# Patient Record
Sex: FEMALE | Race: ASIAN | NOT HISPANIC OR LATINO | Employment: FULL TIME | URBAN - METROPOLITAN AREA
[De-identification: names, ages, dates, MRNs, and addresses within clinical notes are randomized per-mention and may not be internally consistent; named-entity substitution may affect disease eponyms.]

---

## 2018-11-20 ENCOUNTER — OFFICE VISIT (OUTPATIENT)
Dept: OPTOMETRY | Facility: CLINIC | Age: 28
End: 2018-11-20
Payer: COMMERCIAL

## 2018-11-20 ENCOUNTER — TELEPHONE (OUTPATIENT)
Dept: OPTOMETRY | Facility: CLINIC | Age: 28
End: 2018-11-20

## 2018-11-20 DIAGNOSIS — H52.13 MYOPIA OF BOTH EYES: Primary | ICD-10-CM

## 2018-11-20 PROCEDURE — 92015 DETERMINE REFRACTIVE STATE: CPT | Performed by: OPTOMETRIST

## 2018-11-20 PROCEDURE — 92004 COMPRE OPH EXAM NEW PT 1/>: CPT | Performed by: OPTOMETRIST

## 2018-11-20 PROCEDURE — 92310 CONTACT LENS FITTING OU: CPT | Mod: GA | Performed by: OPTOMETRIST

## 2018-11-20 ASSESSMENT — REFRACTION_CURRENTRX
OS_BRAND: ALCON AIR OPTIX PLUS HYDRAGLYDE BC 8.6, D 14.2
OD_BRAND: ALCON AIR OPTIX PLUS HYDRAGLYDE BC 8.6, D 14.2
OD_SPHERE: -4.25
OS_SPHERE: -4.25

## 2018-11-20 ASSESSMENT — REFRACTION_MANIFEST
OS_SPHERE: -4.75
OD_AXIS: 065
OD_CYLINDER: +0.25
OS_CYLINDER: +0.25
OS_AXIS: 100
OD_SPHERE: -4.75

## 2018-11-20 ASSESSMENT — VISUAL ACUITY
OS_CC: 20/20
CORRECTION_TYPE: CONTACTS
OD_CC: 20/30
METHOD: SNELLEN - LINEAR
OD_CC: 20/20
OS_CC: 20/30

## 2018-11-20 ASSESSMENT — TONOMETRY
OD_IOP_MMHG: 17
IOP_METHOD: TONOPEN
OS_IOP_MMHG: 15

## 2018-11-20 ASSESSMENT — EXTERNAL EXAM - LEFT EYE: OS_EXAM: NORMAL

## 2018-11-20 ASSESSMENT — SLIT LAMP EXAM - LIDS
COMMENTS: NORMAL
COMMENTS: NORMAL

## 2018-11-20 ASSESSMENT — CUP TO DISC RATIO
OD_RATIO: 0.3
OS_RATIO: 0.3

## 2018-11-20 ASSESSMENT — CONF VISUAL FIELD
OS_NORMAL: 1
OD_NORMAL: 1

## 2018-11-20 ASSESSMENT — EXTERNAL EXAM - RIGHT EYE: OD_EXAM: NORMAL

## 2018-11-20 NOTE — PATIENT INSTRUCTIONS
Eyeglass prescription given.    Contact lens prescription given.  Dispensed trial contacts of -4.00.  Trials of -4.25 will be ordered for .  Ok to order if satisfied.  Optional glasses with bluetech lens over contact lenses for stress relieving.    Return in 1 year for a complete eye exam or sooner if needed.    Jeovany Casas, TUNG    The affects of the dilating drops last for 4- 6 hours.  You will be more sensitive to light and vision will be blurry up close.  Mydriatic sunglasses were given if needed.      Optometry Providers       Clinic Locations                                 Telephone Number   Dr. Cheryl White [place]   [facility] [place]   [place] [phone]     [place] [facility] Hours:                [facility] Hours:       [place] [facility] Hours:   [address]   [address] [facility] [address]     [address] [place] [address]   RON Gomez [address]    [place] [address]  Phone: [phone]                    Phone: [phone]     Phone: [phone]                      Monday 8:00-7:00                          Monday 8:00-7:00                          Monday 8:00-7:00              Tuesday 8:00-6:00                          Tuesday 8:00-7:00                          Tuesday 8:00-7:00              Wednesday 8:00-6:00                  Wednesday 8:00-7:00                   Wednesday 8:00-7:00      Thursday 8:00-6:00                        Thursday 8:00-7:00                         Thursday 8:00-7:00            Friday 8:00-5:00                              Friday 8:00-5:00                              Friday 8:00-5:00    [facility] Hours:   [address] [facility] R[place] [id number] [phone]    Monday 8:00-7:00  Tuesday 8:00-7:00  Wednesday 8:00-7:00  Thursday 8:00-7:00  Friday 8:00-5:00  Please log on to [email or web address] to order your contact lenses.  The link is found on  the [facility] page.  As always, Thank you for trusting us with your health care needs!

## 2018-11-20 NOTE — LETTER
11/20/2018         RE: Coco Shell  [address] [address] [place] [address]        Dear Colleague,    Thank you for referring your patient, Coco Shell, to the [facility]. Please see a copy of my visit note below.    Chief Complaint   Patient presents with     COMPREHENSIVE EYE EXAM     more contacts,fit fee ok        Previous contact lens wearer? Yes: breathables  xw monthly 8.6 14.0 -3.75 both eyes/same as biofinity  Comfort of contact lenses :good  Satisfied with current lenses: Yes        Last Eye Exam: 1+ year  Dilated Previously: Yes    What are you currently using to see?  glasses and contacts    Distance Vision Acuity: Noticed gradual change in both eyes    Near Vision Acuity: Satisfied with vision while reading  with glasses    Eye Comfort: good  Do you use eye drops? 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     Medical, surgical and family histories reviewed and updated 11/20/2018.       OBJECTIVE: See Ophthalmology exam    ASSESSMENT:    ICD-10-CM    1. Myopia of both eyes H52.13 EYE EXAM (SIMPLE-NONBILLABLE)     CONTACT LENS FITTING,BILAT w/ signed waiver     REFRACTION      PLAN:     Patient Instructions   Eyeglass prescription given.    Contact lens prescription given.  Dispensed trial contacts.  Ok to order if satisfied.  Optional glasses with bluetech lens over contact lenses for stress relieving.    Return in 1 year for a complete eye exam or sooner if needed.    Jeovany Casas, OD                               Again, thank you for allowing me to participate in the care of your patient.        Sincerely,        Jeovany Casas, OD

## 2018-11-20 NOTE — PROGRESS NOTES
Chief Complaint   Patient presents with     COMPREHENSIVE EYE EXAM     more contacts,fit fee ok        Previous contact lens wearer? Yes: breathables  xw monthly 8.6 14.0 -3.75 both eyes/same as biofinity  Comfort of contact lenses :good  Satisfied with current lenses: Yes        Last Eye Exam: 1+ year  Dilated Previously: Yes    What are you currently using to see?  glasses and contacts    Distance Vision Acuity: Noticed gradual change in both eyes    Near Vision Acuity: Satisfied with vision while reading  with glasses    Eye Comfort: good  Do you use eye drops? 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     Medical, surgical and family histories reviewed and updated 11/20/2018.       OBJECTIVE: See Ophthalmology exam    ASSESSMENT:    ICD-10-CM    1. Myopia of both eyes H52.13 EYE EXAM (SIMPLE-NONBILLABLE)     CONTACT LENS FITTING,BILAT w/ signed waiver     REFRACTION      PLAN:     Patient Instructions   Eyeglass prescription given.    Contact lens prescription given.  Dispensed trial contacts of -4.00.  Trials of -4.25 will be ordered for .  Ok to order if satisfied.  Optional glasses with bluetech lens over contact lenses for stress relieving.    Return in 1 year for a complete eye exam or sooner if needed.    Jeovany Casas, OD

## 2018-11-20 NOTE — MR AVS SNAPSHOT
After Visit Summary   11/20/2018    Coco Shell    MRN: [id number]           Patient Information     Date Of Birth          1990        Visit Information        Provider Department      11/20/2018 4:00 PM Jeovany Casas OD [facility]        Today's Diagnoses     Myopia of both eyes    -  1      Care Instructions    Eyeglass prescription given.    Contact lens prescription given.  Dispensed trial contacts.  Ok to order if satisfied.  Optional glasses with bluetech lens over contact lenses for stress relieving.    Return in 1 year for a complete eye exam or sooner if needed.    Jeovany Casas, TUNG        The affects of the dilating drops last for 4- 6 hours.  You will be more sensitive to light and vision will be blurry up close.  Mydriatic sunglasses were given if needed.      Optometry Providers       Clinic Locations                                 Telephone Number   Dr. Cheryl White [facility] and [place]   [place] [phone]     [place] Hours:                [facility] Hours:       [place] [facility] Hours:   [address] [facility] [address]   [address] [facility]     [address] [place] [address]   [place] [address]    [place] [address]  Phone: [phone]                    Phone: [phone]     Phone: [phone]                      Monday 8:00-7:00                          Monday 8:00-7:00                          Monday 8:00-7:00              Tuesday 8:00-6:00                          Tuesday 8:00-7:00                          Tuesday 8:00-7:00              Wednesday 8:00-6:00                  Wednesday 8:00-7:00                   Wednesday 8:00-7:00      Thursday 8:00-6:00                        Thursday 8:00-7:00                         Thursday 8:00-7:00            Friday 8:00-5:00                              Friday 8:00-5:00                               Friday 8:00-5:00    [facility] Hours:   [address] [facility] [place] [address]  [phone]    Monday 8:00-7:00  Tuesday 8:00-7:00  Wednesday 8:00-7:00  Thursday 8:00-7:00  Friday 8:00-5:00  Please log on to [place] [facility] [employer] to order your contact lenses.  The link is found on the [facility] Care [facility] Vision Services page.  As always, Thank you for trusting us with your health care needs!            Follow-ups after your visit        Follow-up notes from your care team     Return in about 1 year (around 11/20/2019) for Annual Visit.      Who to contact     If you have questions or need follow up information about today's clinic visit or your schedule please contact [facility] directly at [phone].  Normal or non-critical lab and imaging results will be communicated to you by MyChart, letter or phone within 4 business days after the clinic has received the results. If you do not hear from us within 7 days, please contact the clinic through MyChart or phone. If you have a critical or abnormal lab result, we will notify you by phone as soon as possible.  Submit refill requests through [facility] or call your pharmacy and they will forward the refill request to us. Please allow 3 business days for your refill to be completed.          Additional Information About Your Visit        Care EveryWhere ID     This is your Care EveryWhere ID. This could be used by other organizations to access your [place] medical records  [id number]         Blood Pressure from Last 3 Encounters:   No data found for BP    Weight from Last 3 Encounters:   No data found for Wt              We Performed the Following     CONTACT LENS FITTING,BILAT w/ signed waiver     EYE EXAM (SIMPLE-NONBILLABLE)     REFRACTION        Primary Care Provider Fax #    Physician No Ref-Primary [phone]       No address on file        Equal Access to Services     LIGIA ROSALES : ava Ibarra  tamara mondragon waxdre huyin hayaarichi hernandezramana arnoldorupali lafedericorichi marie. So [facility] [phone].    ATENCIÓN: Si kim rodriguez, tiene a ventura disposición servicios gratuitos de asistencia lingüística. Llame al [phone].    We comply with applicable federal civil rights laws and [place] laws. We do not discriminate on the basis of race, color, national origin, age, disability, sex, sexual orientation, or gender identity.            Thank you!     Thank you for choosing [facility]  for your care. Our goal is always to provide you with excellent care. Hearing back from our patients is one way we can continue to improve our services. Please take a few minutes to complete the written survey that you may receive in the mail after your visit with us. Thank you!             Your Updated Medication List - Protect others around you: Learn how to safely use, store and throw away your medicines at www.disposemymeds.org.      Notice  As of 11/20/2018  5:09 PM    You have not been prescribed any medications.

## 2018-11-20 NOTE — TELEPHONE ENCOUNTER
Right Lionel Air Optix Plus HydraGlyde BC 8.6, D 14.2 -4.25   Left Lionel Air Optix Plus HydraGlyde BC 8.6, D 14.2 -4.25       Order trials of final prescription for  at [place]- then ok to order.      Carey Tena ORDERED CONTACTS 11/20/2018.

## 2021-05-14 ENCOUNTER — THERAPY VISIT (OUTPATIENT)
Dept: PHYSICAL THERAPY | Facility: CLINIC | Age: 31
End: 2021-05-14
Payer: COMMERCIAL

## 2021-05-14 DIAGNOSIS — M54.50 BILATERAL LOW BACK PAIN WITHOUT SCIATICA: ICD-10-CM

## 2021-05-14 PROCEDURE — 97530 THERAPEUTIC ACTIVITIES: CPT | Performed by: PHYSICAL THERAPIST

## 2021-05-14 PROCEDURE — 97161 PT EVAL LOW COMPLEX 20 MIN: CPT | Performed by: PHYSICAL THERAPIST

## 2021-05-14 PROCEDURE — 97110 THERAPEUTIC EXERCISES: CPT | Performed by: PHYSICAL THERAPIST

## 2021-05-14 NOTE — PROGRESS NOTES
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  She was lifting 40# pots to do some home gardening. Coco had a sudden and severe onset of low back pain that kept her from work for 4 days. She treated this with ibuprofen, rest, and a muscle relaxant. She feels well and back to normal now - but wants a core strengthening program to avoid this in the future. 0/10 pain. No referred symptoms. PMH of scoliosis, that was followed as a young person. No need for bracing or surgery.    The history is provided by the patient. No  was used.   Therapist Generated HPI Evaluation         Type of problem:  Lumbar.    This is a new condition.  Condition occurred with:  Lifting and twisting.  Where condition occurred: at home.  Patient reports pain:  Lower lumbar spine.  Pain quality: resolved.   Pain radiates to:  No radiation.   Since onset symptoms are rapidly improving.         Restrictions due to condition include:  Working in normal job without restrictions.  Barriers include:  None as reported by patient.    Patient Health History  Coco Shell being seen for recent acute LBP that has resolved.          Pain is reported as 0/10 on pain scale.  General health as reported by patient is good.  Pertinent medical history includes: none. Other medical history details: scoliosis.   Red flags:  None as reported by patient.  Medical allergies: none.       Current medications:  None. Other medications details: Vitamin D.    Current occupation is Pharmacist.   Primary job tasks include:  Computer work and prolonged sitting.                                    Objective:  Standing Alignment:                  General deviations alignment: left thoracolumbar scoliosis.  Gait:    Gait Type:  Normal                    Lumbar/SI Evaluation  ROM:  AROM Lumbar: normal      Lumbar Myotomes:  normal                  Neural  Tension/Mobility:  Lumbar:  Not assessed        Lumbar Palpation:    Tenderness present at Left:    Erector Spinae  Tenderness present at Right: Erector Spinae                                                                                          Musculoskeletal:        Back:        ROS    Assessment/Plan:    Patient is a 30 year old female with lumbar complaints.    Patient has the following significant findings with corresponding treatment plan.                Diagnosis 1:  Lumbar pain  Pain -  self management, education and home program  Impaired muscle performance - neuro re-education and home program  Decreased function - therapeutic activities and home program    Therapy Evaluation Codes:   1) History comprised of:   Personal factors that impact the plan of care:      None.    Comorbidity factors that impact the plan of care are:      Scoliosis.     Medications impacting care: Vitamin D.  2) Examination of Body Systems comprised of:   Body structures and functions that impact the plan of care:      Lumbar spine.   Activity limitations that impact the plan of care are:      Lifting.  3) Clinical presentation characteristics are:   Stable/Uncomplicated.  4) Decision-Making    Low complexity using standardized patient assessment instrument and/or measureable assessment of functional outcome.  Cumulative Therapy Evaluation is: Low complexity.    Previous and current functional limitations:  (See Goal Flow Sheet for this information)    Short term and Long term goals: (See Goal Flow Sheet for this information)     Communication ability:  Patient appears to be able to clearly communicate and understand verbal and written communication and follow directions correctly.  Treatment Explanation - The following has been discussed with the patient:   RX ordered/plan of care  Anticipated outcomes  Possible risks and side effects  This patient would benefit from PT intervention to resume normal activities.   Rehab  potential is good.    Frequency:  1 X week, once daily  Duration:  for 5 weeks  Discharge Plan:  Achieve all LTG.  Independent in home treatment program.  Reach maximal therapeutic benefit.    Please refer to the daily flowsheet for treatment today, total treatment time and time spent performing 1:1 timed codes.

## 2021-05-14 NOTE — LETTER
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 Coco had a sudden and severe onset of low back pain that kept her from work for 4 days. She treated this with ibuprofen, rest, and a muscle relaxant. She feels well and back to normal now - but wants a core strengthening program to avoid this in the future. 0/10 pain. No referred symptoms. PMH of scoliosis, that was followed as a young person. No need for bracing or surgery. The history is provided by the patient. No  was used.     Therapist Generated HPI Evaluation  Type of problem:  Lumbar.  This is a new condition.  Condition occurred with:  Lifting and twisting.  Where condition occurred: at home.  Patient reports pain:  Lower lumbar spine.  Pain quality: resolved.   Pain radiates to:  No radiation.   Since onset symptoms are rapidly improving.  Restrictions due to condition include:  Working in normal job without restrictions.  Barriers include:  None as reported by patient.    Patient Health History  Coco Shell being seen for recent acute LBP that has resolved.   Pain is reported as 0/10 on pain scale.  General health as reported by patient is good.  Pertinent medical history includes: none. Other medical history details: scoliosis.   Red flags:  None as reported by patient.  Medical allergies: none.    Current medications:  None. Other medications  details: Vitamin D.    Current occupation is Pharmacist.   Primary job tasks include:  Computer work and prolonged sitting.                                  Objective:  Standing Alignment:    General deviations alignment: left thoracolumbar scoliosis.  Gait:    Gait Type:  Normal     Lumbar/SI Evaluation  ROM:  AROM Lumbar: normal  Lumbar Myotomes:  normal  Neural Tension/Mobility:  Lumbar:  Not assessed    Lumbar Palpation:    Tenderness present at Left:    Erector Spinae  Tenderness present at Right: Erector Spinae                             Musculoskeletal:        Back:      Assessment/Plan:    Patient is a 30 year old female with lumbar complaints.    Patient has the following significant findings with corresponding treatment plan.                Diagnosis 1:  Lumbar pain  Pain -  self management, education and home program  Impaired muscle performance - neuro re-education and home program  Decreased function - therapeutic activities and home program    Therapy Evaluation Codes:   1) History comprised of:   Personal factors that impact the plan of care:      None.    Comorbidity factors that impact the plan of care are:      Scoliosis.     Medications impacting care: Vitamin D.  2) Examination of Body Systems comprised of:   Body structures and functions that impact the plan of care:      Lumbar spine.   Activity limitations that impact the plan of care are:      Lifting.  3) Clinical presentation characteristics are:   Stable/Uncomplicated.  4) Decision-Making    Low complexity using standardized patient assessment instrument and/or measureable assessment of functional outcome.  Cumulative Therapy Evaluation is: Low complexity.    Previous and current functional limitations:  (See Goal Flow Sheet for this information)    Short term and Long term goals: (See Goal Flow Sheet for this information)     Communication ability:  Patient appears to be able to clearly communicate and understand verbal and written  communication and follow directions correctly.  Treatment Explanation - The following has been discussed with the patient:   RX ordered/plan of care  Anticipated outcomes  Possible risks and side effects  This patient would benefit from PT intervention to resume normal activities.   Rehab potential is good.    Frequency:  1 X week, once daily  Duration:  for 5 weeks  Discharge Plan:  Achieve all LTG.  Independent in home treatment program.  Reach maximal therapeutic benefit.    Thank you for your referral.    INQUIRIES  Therapist: Rashida Strauss PT, DPT  [address] [address]  Phone: [phone]  Fax: [phone]

## 2021-05-19 ENCOUNTER — THERAPY VISIT (OUTPATIENT)
Dept: PHYSICAL THERAPY | Facility: CLINIC | Age: 31
End: 2021-05-19
Payer: COMMERCIAL

## 2021-05-19 DIAGNOSIS — M54.50 ACUTE BILATERAL LOW BACK PAIN WITHOUT SCIATICA: ICD-10-CM

## 2021-05-19 PROCEDURE — 97110 THERAPEUTIC EXERCISES: CPT | Performed by: PHYSICAL THERAPIST

## 2021-05-19 PROCEDURE — 97530 THERAPEUTIC ACTIVITIES: CPT | Performed by: PHYSICAL THERAPIST

## 2021-05-19 NOTE — LETTER
[facility]  [address] [address] [place] [address]  [phone]    2021    Re: Coco Shell   :   1990  MRN:  [id number]   REFERRING PHYSICIAN:   Tegan CONNELL [facility]  Date of Initial Evaluation:  2021  Visits:  Rxs Used: 2  Reason for Referral:  Acute bilateral low back pain without sciatica    DISCHARGE REPORT  Progress reporting period is from  to 2021.       SUBJECTIVE  Subjective: Doing well, cautious with body mechanics and not doing any heavy lifitng, consistent with HEP - has returned to Yoga.   Current Pain level: 0/10.     Initial Pain level: 10/10. Changes in function:  Yes (See Goal flowsheet attached for changes in current functional level).  Adverse reaction to treatment or activity: None    OBJECTIVE  Objective: trunk AROM is now WNL's, some glut and quad weakness - but not due to low back     ASSESSMENT/PLAN  Updated problem list and treatment plan: Diagnosis 1:  LBP  Pain -  self management, education and home program  Decreased ROM/flexibility - therapeutic exercise and home program  Decreased strength - therapeutic exercise and home program  Impaired muscle performance - neuro re-education and home program  Decreased function - therapeutic activities and home program  Impaired posture - neuro re-education  STG/LTGs have been met or progress has been made towards goals:  Yes (See Goal flow sheet completed today.)  Assessment of Progress: The patient's condition is improving.  Self Management Plans:  Patient has been instructed in a home treatment program.  Patient  has been instructed in self management of symptoms.    Coco continues to require the following intervention to meet STG and LTG's:  PT intervention is no longer required to meet STG/LTG.    Recommendations:  This patient is ready to be discharged from therapy and continue their home  treatment program.    Thank you for your referral.    INQUIRIES  Therapist: Rashida Strauss PT, DPT  [address] [address]  Phone: [phone]  Fax: [phone]

## 2021-06-23 PROBLEM — M54.50 BILATERAL LOW BACK PAIN WITHOUT SCIATICA: Status: RESOLVED | Noted: 2021-05-14 | Resolved: 2021-06-23

## 2021-06-24 NOTE — PROGRESS NOTES
Subjective:  HPI  Physical Exam                    Objective:  System    Physical Exam    General     ROS    Assessment/Plan:    DISCHARGE REPORT    Progress reporting period is from 5/7 to 5/14/2021.       SUBJECTIVE   Subjective: Doing well, cautious with body mechanics and not doing any heavy lifitng, consistent with HEP - has returned to Yoga     Current Pain level: 0/10.      Initial Pain level: 10/10.   Changes in function:  Yes (See Goal flowsheet attached for changes in current functional level)  Adverse reaction to treatment or activity: None    OBJECTIVE    Objective: trunk AROM is now WNL's, some glut and quad weakness - but not due to low back     ASSESSMENT/PLAN  Updated problem list and treatment plan: Diagnosis 1:  LBP  Pain -  self management, education and home program  Decreased ROM/flexibility - therapeutic exercise and home program  Decreased strength - therapeutic exercise and home program  Impaired muscle performance - neuro re-education and home program  Decreased function - therapeutic activities and home program  Impaired posture - neuro re-education  STG/LTGs have been met or progress has been made towards goals:  Yes (See Goal flow sheet completed today.)  Assessment of Progress: The patient's condition is improving.  Self Management Plans:  Patient has been instructed in a home treatment program.  Patient  has been instructed in self management of symptoms.    Coco continues to require the following intervention to meet STG and LTG's:  PT intervention is no longer required to meet STG/LTG.    Recommendations:  This patient is ready to be discharged from therapy and continue their home treatment program.    Please refer to the daily flowsheet for treatment today, total treatment time and time spent performing 1:1 timed codes.

## 2022-12-19 ENCOUNTER — LAB REQUISITION (OUTPATIENT)
Dept: LAB | Facility: CLINIC | Age: 32
End: 2022-12-19

## 2022-12-19 DIAGNOSIS — Z12.4 ENCOUNTER FOR SCREENING FOR MALIGNANT NEOPLASM OF CERVIX: ICD-10-CM

## 2022-12-19 PROCEDURE — G0145 SCR C/V CYTO,THINLAYER,RESCR: HCPCS | Performed by: NURSE PRACTITIONER

## 2022-12-19 PROCEDURE — 87624 HPV HI-RISK TYP POOLED RSLT: CPT | Performed by: NURSE PRACTITIONER

## 2022-12-21 LAB
BKR LAB AP GYN ADEQUACY: NORMAL
BKR LAB AP GYN INTERPRETATION: NORMAL
BKR LAB AP HPV REFLEX: NORMAL
BKR LAB AP LMP: NORMAL
BKR LAB AP PREVIOUS ABNL DX: NORMAL
BKR LAB AP PREVIOUS ABNORMAL: NORMAL
PATH REPORT.COMMENTS IMP SPEC: NORMAL
PATH REPORT.COMMENTS IMP SPEC: NORMAL
PATH REPORT.RELEVANT HX SPEC: NORMAL

## 2022-12-23 LAB
HUMAN PAPILLOMA VIRUS 16 DNA: NEGATIVE
HUMAN PAPILLOMA VIRUS 18 DNA: NEGATIVE
HUMAN PAPILLOMA VIRUS FINAL DIAGNOSIS: NORMAL
HUMAN PAPILLOMA VIRUS OTHER HR: NEGATIVE

## 2023-06-05 ENCOUNTER — LAB REQUISITION (OUTPATIENT)
Dept: LAB | Facility: CLINIC | Age: 33
End: 2023-06-05
Payer: COMMERCIAL

## 2023-06-05 DIAGNOSIS — Z36.9 ENCOUNTER FOR ANTENATAL SCREENING, UNSPECIFIED: ICD-10-CM

## 2023-06-05 LAB
BASOPHILS # BLD AUTO: 0.1 10E3/UL (ref 0–0.2)
BASOPHILS NFR BLD AUTO: 1 %
EOSINOPHIL # BLD AUTO: 0.1 10E3/UL (ref 0–0.7)
EOSINOPHIL NFR BLD AUTO: 1 %
ERYTHROCYTE [DISTWIDTH] IN BLOOD BY AUTOMATED COUNT: 12.6 % (ref 10–15)
HCT VFR BLD AUTO: 38.3 % (ref 35–47)
HGB BLD-MCNC: 12.4 G/DL (ref 11.7–15.7)
IMM GRANULOCYTES # BLD: 0 10E3/UL
IMM GRANULOCYTES NFR BLD: 0 %
LYMPHOCYTES # BLD AUTO: 2.4 10E3/UL (ref 0.8–5.3)
LYMPHOCYTES NFR BLD AUTO: 23 %
MCH RBC QN AUTO: 29.6 PG (ref 26.5–33)
MCHC RBC AUTO-ENTMCNC: 32.4 G/DL (ref 31.5–36.5)
MCV RBC AUTO: 91 FL (ref 78–100)
MONOCYTES # BLD AUTO: 0.5 10E3/UL (ref 0–1.3)
MONOCYTES NFR BLD AUTO: 5 %
NEUTROPHILS # BLD AUTO: 7.7 10E3/UL (ref 1.6–8.3)
NEUTROPHILS NFR BLD AUTO: 70 %
NRBC # BLD AUTO: 0 10E3/UL
NRBC BLD AUTO-RTO: 0 /100
PLATELET # BLD AUTO: 283 10E3/UL (ref 150–450)
RBC # BLD AUTO: 4.19 10E6/UL (ref 3.8–5.2)
WBC # BLD AUTO: 10.8 10E3/UL (ref 4–11)

## 2023-06-05 PROCEDURE — 86803 HEPATITIS C AB TEST: CPT | Mod: ORL | Performed by: NURSE PRACTITIONER

## 2023-06-05 PROCEDURE — 80081 OBSTETRIC PANEL INC HIV TSTG: CPT | Mod: ORL | Performed by: NURSE PRACTITIONER

## 2023-06-06 LAB
ABO/RH(D): NORMAL
ANTIBODY SCREEN: NEGATIVE
HBV SURFACE AG SERPL QL IA: NONREACTIVE
HCV AB SERPL QL IA: NONREACTIVE
HIV 1+2 AB+HIV1 P24 AG SERPL QL IA: NONREACTIVE
RPR SER QL: NONREACTIVE
RUBV IGG SERPL QL IA: 2 INDEX
RUBV IGG SERPL QL IA: POSITIVE
SPECIMEN EXPIRATION DATE: NORMAL

## 2023-06-27 PROCEDURE — 87086 URINE CULTURE/COLONY COUNT: CPT | Performed by: OBSTETRICS & GYNECOLOGY

## 2023-06-28 ENCOUNTER — LAB REQUISITION (OUTPATIENT)
Dept: LAB | Facility: CLINIC | Age: 33
End: 2023-06-28

## 2023-06-28 DIAGNOSIS — Z36.9 ENCOUNTER FOR ANTENATAL SCREENING, UNSPECIFIED: ICD-10-CM

## 2023-06-30 LAB — BACTERIA UR CULT: NO GROWTH

## 2023-11-06 ENCOUNTER — TRANSFERRED RECORDS (OUTPATIENT)
Dept: HEALTH INFORMATION MANAGEMENT | Facility: CLINIC | Age: 33
End: 2023-11-06
Payer: COMMERCIAL

## 2023-11-20 ENCOUNTER — MEDICAL CORRESPONDENCE (OUTPATIENT)
Dept: HEALTH INFORMATION MANAGEMENT | Facility: CLINIC | Age: 33
End: 2023-11-20
Payer: COMMERCIAL

## 2023-11-21 ENCOUNTER — TRANSCRIBE ORDERS (OUTPATIENT)
Dept: OTHER | Age: 33
End: 2023-11-21

## 2023-11-21 DIAGNOSIS — O24.410 GESTATIONAL DIABETES MELLITUS IN PREGNANCY, DIET CONTROLLED: Primary | ICD-10-CM

## 2023-12-04 ENCOUNTER — TELEPHONE (OUTPATIENT)
Dept: ENDOCRINOLOGY | Facility: CLINIC | Age: 33
End: 2023-12-04
Payer: COMMERCIAL

## 2023-12-04 NOTE — TELEPHONE ENCOUNTER
Spoke with patient. Pt sent google doc link: [email or web address]://[email or web address]/[email or web address]?usp=sharing . Data obtained. Taylor Vargas CMA on 12/4/2023 at 3:35 PM

## 2023-12-06 ENCOUNTER — VIRTUAL VISIT (OUTPATIENT)
Dept: ENDOCRINOLOGY | Facility: CLINIC | Age: 33
End: 2023-12-06
Attending: NURSE PRACTITIONER
Payer: COMMERCIAL

## 2023-12-06 DIAGNOSIS — O24.410 DIET CONTROLLED GESTATIONAL DIABETES MELLITUS (GDM) IN THIRD TRIMESTER: ICD-10-CM

## 2023-12-06 PROCEDURE — 99203 OFFICE O/P NEW LOW 30 MIN: CPT | Mod: VID | Performed by: PHYSICIAN ASSISTANT

## 2023-12-06 RX ORDER — PRENATAL VIT/IRON FUM/FOLIC AC 27MG-0.8MG
TABLET ORAL
COMMUNITY

## 2023-12-06 RX ORDER — BLOOD-GLUCOSE METER
EACH MISCELLANEOUS SEE ADMIN INSTRUCTIONS
COMMUNITY
Start: 2023-10-13

## 2023-12-06 RX ORDER — LANCETS 28 GAUGE
EACH MISCELLANEOUS
COMMUNITY
Start: 2023-11-07

## 2023-12-06 RX ORDER — BLOOD-GLUCOSE METER
KIT MISCELLANEOUS
COMMUNITY
Start: 2023-12-04

## 2023-12-06 RX ORDER — CHLORAL HYDRATE 500 MG
CAPSULE ORAL
COMMUNITY

## 2023-12-06 RX ORDER — CALCIUM CARBONATE 500(1250)
TABLET ORAL
COMMUNITY

## 2023-12-06 ASSESSMENT — PAIN SCALES - GENERAL: PAINLEVEL: NO PAIN (0)

## 2023-12-06 NOTE — PROGRESS NOTES
Assessment/Plan :   Hyperglycemia and pregnancy. We reviewed Coco's recent blood sugars and she is doing great. I can see during gestational week 32 where her numbers were a little elevated. We discussed some possible causes of the increase, including stress or a mild head cold. We also discussed how lack of sleep can lead to an increase in fasting blood sugars. I do not think that she needs any medications at this time. I encouraged her to continue to monitor her blood sugars and to work on eating a healthy, well rounded diet. We will follow-up as needed.    Due to the COVID 19 pandemic this visit was a telephone/video visit in order to help prevent spread of infection in this patient and the general population. The patient gave verbal consent for the visit today. I have independently reviewed and interpreted labs, imaging as indicated.       Distant Location (provider location):  Off-site  Mode of Communication:  Video Conference via [employer]  Chart review/prep time 10    Joined the call at 12/6/2023, 1:00:39 pm.  Left the call at 12/6/2023, 1:12:17 pm.  You were on the call for 11 minutes 38 seconds .  25 minutes spent on the date of the encounter doing chart review, history and exam, documentation and further activities as noted above.      Chief complaint:  Coco is a 32 year old female who comes to our office for concerns regarding gestational diabetes.    I have reviewed Care Everywhere including [facility], [facility], [facility], [facility], [facility], [facility] , [facility], [place] lab reports, imaging reports and provider notes as indicated.      HISTORY OF PRESENT ILLNESS  Coco is currently 35 wks gestation with her first pregnancy. There was some concern regarding her blood sugars and so she has been monitoring her numbers closely since around 27 wks gestation. Her target blood sugars are fasting readings under 95 mg/dl and 1 hr post-prandial under 140 mg/dl. She is using  fingerstick testing to monitor her blood sugars 4x daily.    Recently, at gestational week 32, she had some higher readings. She had one elevated fasting reading at 109 mg/dl and an elevated post-prandial reading of 155 mg/dl. There was a concern that her blood sugars would continue to increase and a referral was placed to our office. However, since that time her blood sugars have stabilized with morning fasting readings under 95 mg/dl and post-prandial readings under 145 mg/dl.    Coco has never had any problems with severe hyperglycemia and/or hypoglycemia. She also has not noticed any vision changes or numbness/tingling in her feet. Overall, she feels good. She has also continued to make sure that she is eating the proper amount of carbohydrates throughout the day and that she is staying well hydrated.    REVIEW OF SYSTEMS    Endocrine: positive for post-prandial hyperglycemia  Skin: negative  Eyes: negative for, visual blurring, redness, tearing  Ears/Nose/Throat: negative  Respiratory: No shortness of breath, dyspnea on exertion, cough, or hemoptysis  Cardiovascular: negative for, chest pain, lower extremity edema, and exercise intolerance  Gastrointestinal: negative for, nausea, vomiting, constipation, and diarrhea  Genitourinary: negative for, nocturia, dysuria, frequency, and urgency  Musculoskeletal: negative for, muscular weakness, nocturnal cramping, and foot pain  Neurologic: negative for, local weakness, numbness or tingling of hands, and numbness or tingling of feet  Psychiatric: negative  Hematologic/Lymphatic/Immunologic: negative    Past Medical History  No past medical history on file.    Medications  Current Outpatient Medications   Medication Sig Dispense Refill    blood glucose monitoring (FREESTYLE) lancets       Blood Glucose Monitoring Suppl (ACCU-CHEK GUIDE) w/Device KIT See Admin Instructions      calcium carbonate (OS-RHODA) 500 MG tablet       fish oil-omega-3 fatty acids 1000 MG capsule        FREESTYLE LITE test strip       omeprazole (PRILOSEC) 20 MG DR capsule       Prenatal Vit-Fe Fumarate-FA (PRENATAL MULTIVITAMIN W/IRON) 27-0.8 MG tablet          Allergies  No Known Allergies    Family History  family history includes Cancer in her maternal grandfather and paternal grandfather; Cerebrovascular Disease in her father and maternal grandmother; Diabetes in her father and maternal grandmother; Glaucoma in her maternal grandmother; Hypertension in her father; Thyroid Disease in her mother.    Social History  Social History     Tobacco Use    Smoking status: Never     Passive exposure: Never    Smokeless tobacco: Never       Physical Exam  There is no height or weight on file to calculate BMI.  GENERAL: no distress  SKIN: Visible skin clear. No significant rash, abnormal pigmentation or lesions.  EYES: Eyes grossly normal to inspection.  No discharge or erythema, or obvious scleral/conjunctival abnormalities.  NECK: visible goiter is not present; no visible neck masses  RESP: No audible wheeze, cough, or visible cyanosis.  No visible retractions or increased work of breathing.    NEURO: Awake, alert, responds appropriately to questions.  Mentation and speech fluent.  PSYCH:affect normal and appearance well-groomed.      DATA REVIEW  Date Fasting  Breakfast Lunch Dinner   11/4 84  111 105   11/5 85 125 129 112   11/6 84 165 114 103   11/7 82 122 138 111   11/8 86 117 108 128   11/9 95 111 110 122   11/10 88  91 188,134   11/11 82  125 95   11/12 82 142  134   11/13 91 115 155 109   11/14 83 117 95 125   11/15 91 132 127 121   11/16 97 126 100 95   11/17 109  140 120   11/18 89   93   11/19 88 102  107   11/20 79 119 88 94   11/21 84 128 92 140   11/22 68 118 90 109   11/23 89 120 85 105   11/24 82 131 111 103   11/25 93 137 113 136   11/26 91  118 128   11/27 95 129 114 97   11/28 86 136 117 115   11/29 83 79 118 119   11/30 83 129 128 110   12/1 99 112 114 149   12/2 92 121  131   12/3 85 142 110 125    12/4 93 125 125    12/5 89 666  115

## 2023-12-06 NOTE — NURSING NOTE
Is the patient currently in the state of [place]? YES    Visit mode:VIDEO    If the visit is dropped, the patient can be reconnected by: VIDEO VISIT: Send to e-mail at: [email or web address]    Will anyone else be joining the visit? NO  (If patient encounters technical issues they should call [phone]    How would you like to obtain your AVS? MyChart    Are changes needed to the allergy or medication list? No    Reason for visit: Consult    Shelby Kocher VVF

## 2023-12-06 NOTE — PROGRESS NOTES
Virtual Visit Details    Type of service:  Video Visit     Originating Location (pt. Location): Home    Distant Location (provider location):  Off-site  Platform used for Video Visit: Corby

## 2024-02-09 ENCOUNTER — LAB REQUISITION (OUTPATIENT)
Dept: LAB | Facility: CLINIC | Age: 34
End: 2024-02-09
Payer: COMMERCIAL

## 2024-02-09 DIAGNOSIS — Z83.3 FAMILY HISTORY OF DIABETES MELLITUS: ICD-10-CM

## 2024-02-09 LAB
FASTING STATUS PATIENT QL REPORTED: YES
GLUCOSE SERPL-MCNC: 100 MG/DL (ref 70–99)

## 2024-02-09 PROCEDURE — 82947 ASSAY GLUCOSE BLOOD QUANT: CPT | Mod: ORL | Performed by: NURSE PRACTITIONER

## 2024-02-29 ENCOUNTER — MEDICAL CORRESPONDENCE (OUTPATIENT)
Dept: HEALTH INFORMATION MANAGEMENT | Facility: CLINIC | Age: 34
End: 2024-02-29
Payer: COMMERCIAL

## 2024-04-17 ENCOUNTER — HOSPITAL ENCOUNTER (OUTPATIENT)
Dept: NUCLEAR MEDICINE | Facility: CLINIC | Age: 34
Setting detail: NUCLEAR MEDICINE
Discharge: HOME | End: 2024-04-17
Admitting: FAMILY MEDICINE
Payer: COMMERCIAL

## 2024-04-17 VITALS — WEIGHT: 153 LBS

## 2024-04-17 DIAGNOSIS — R10.13 ABDOMINAL PAIN, EPIGASTRIC: ICD-10-CM

## 2024-04-17 PROCEDURE — A9537 TC99M MEBROFENIN: HCPCS

## 2024-04-17 PROCEDURE — 343N000001 HC RX 343

## 2024-04-17 PROCEDURE — 78227 HEPATOBIL SYST IMAGE W/DRUG: CPT

## 2024-04-17 PROCEDURE — 250N000011 HC RX IP 250 OP 636

## 2024-04-17 RX ORDER — KIT FOR THE PREPARATION OF TECHNETIUM TC 99M MEBROFENIN 45 MG/10ML
6 INJECTION, POWDER, LYOPHILIZED, FOR SOLUTION INTRAVENOUS ONCE
Status: COMPLETED | OUTPATIENT
Start: 2024-04-17 | End: 2024-04-17

## 2024-04-17 RX ADMIN — SINCALIDE 1.4 MCG: 5 INJECTION, POWDER, LYOPHILIZED, FOR SOLUTION INTRAVENOUS at 08:25

## 2024-04-17 RX ADMIN — MEBROFENIN 6.2 MILLICURIE: 45 INJECTION, POWDER, LYOPHILIZED, FOR SOLUTION INTRAVENOUS at 07:25

## 2024-05-07 ENCOUNTER — MEDICAL CORRESPONDENCE (OUTPATIENT)
Dept: HEALTH INFORMATION MANAGEMENT | Facility: CLINIC | Age: 34
End: 2024-05-07
Payer: COMMERCIAL

## 2024-06-01 ENCOUNTER — HEALTH MAINTENANCE LETTER (OUTPATIENT)
Age: 34
End: 2024-06-01

## 2025-06-14 ENCOUNTER — HEALTH MAINTENANCE LETTER (OUTPATIENT)
Age: 35
End: 2025-06-14